# Patient Record
Sex: MALE | Race: WHITE | URBAN - METROPOLITAN AREA
[De-identification: names, ages, dates, MRNs, and addresses within clinical notes are randomized per-mention and may not be internally consistent; named-entity substitution may affect disease eponyms.]

---

## 2017-02-20 ENCOUNTER — EMERGENCY (EMERGENCY)
Facility: HOSPITAL | Age: 32
LOS: 1 days | Discharge: HOME | End: 2017-02-20
Attending: EMERGENCY MEDICINE | Admitting: EMERGENCY MEDICINE
Payer: COMMERCIAL

## 2017-02-20 VITALS
OXYGEN SATURATION: 97 % | DIASTOLIC BLOOD PRESSURE: 74 MMHG | HEART RATE: 72 BPM | TEMPERATURE: 98 F | RESPIRATION RATE: 18 BRPM | SYSTOLIC BLOOD PRESSURE: 126 MMHG

## 2017-02-20 VITALS
WEIGHT: 139.99 LBS | HEART RATE: 70 BPM | OXYGEN SATURATION: 97 % | SYSTOLIC BLOOD PRESSURE: 125 MMHG | RESPIRATION RATE: 18 BRPM | TEMPERATURE: 99 F | DIASTOLIC BLOOD PRESSURE: 72 MMHG

## 2017-02-20 DIAGNOSIS — S02.401A MAXILLARY FRACTURE, UNSPECIFIED SIDE, INITIAL ENCOUNTER FOR CLOSED FRACTURE: ICD-10-CM
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
## 2017-02-20 DIAGNOSIS — S02.2XXA FRACTURE OF NASAL BONES, INITIAL ENCOUNTER FOR CLOSED FRACTURE: ICD-10-CM
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
## 2017-02-20 DIAGNOSIS — Z88.8 ALLERGY STATUS TO OTHER DRUGS, MEDICAMENTS AND BIOLOGICAL SUBSTANCES STATUS: ICD-10-CM
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
## 2017-02-20 DIAGNOSIS — R51 HEADACHE: ICD-10-CM

## 2017-02-20 DIAGNOSIS — R04.0 EPISTAXIS: ICD-10-CM

## 2017-02-20 PROCEDURE — 99284 EMERGENCY DEPT VISIT MOD MDM: CPT | Mod: 25

## 2017-02-20 PROCEDURE — 70486 CT MAXILLOFACIAL W/O DYE: CPT | Mod: 26

## 2017-02-20 PROCEDURE — 99284 EMERGENCY DEPT VISIT MOD MDM: CPT

## 2017-02-20 NOTE — ED PROVIDER NOTE - ENMT, MLM
Airway patent, Nasal mucosa clear. Mouth with normal mucosa. Throat has no vesicles, no oropharyngeal exudates and uvula is midline. mild bruising and abrasion to left lateral nasal bridge. mild swelling. nares patent b/l. no septal hematoma. no active bleeding.

## 2017-02-20 NOTE — ED ADULT TRIAGE NOTE - CHIEF COMPLAINT QUOTE
nosebleed and facial pain after getting hit by a hockey puck last friday.  Denies loc.  Not on thinners.  No  vision loss, neck pain, back pain

## 2017-02-20 NOTE — ED PROVIDER NOTE - ATTENDING CONTRIBUTION TO CARE
Pt is a 31yo M with no PMH who p/w L periorbital pain and epistaxis after being struck with a hockey puck on Friday.  This morning had persistent/recurrent bleeding from nares so came in.  Denies LOC.  No neck pain.  PE - agree with PA exam.  EOM intact and without pain or diplopia.   A/P - Facial trauma - CT facial bones.  Will f/u CT results and treat accordingly.  Epistaxis resolved.

## 2017-02-20 NOTE — ED ADULT NURSE NOTE - OBJECTIVE STATEMENT
Pt received aox3, states he got hit in left eye with hockey puck on Saturday and has soreness since then. Mild bruising noted below left eye. Pt states this morning he had a large nosebleed which stopped spontaneous. Denies pertinent medical hx. Pt states he took advil last on Saturday. Vitals stable. Awaiting provider.

## 2017-02-20 NOTE — ED PROVIDER NOTE - EYES, MLM
Clear bilaterally, pupils equal, round and reactive to light. EOMI intact b/l. mild swelling and tenderness to inferior portion of orbit with mild swelling present.

## 2017-02-20 NOTE — ED PROVIDER NOTE - OBJECTIVE STATEMENT
33 y/o male c/o left orbital pain and epistaxis. pt states struck by hockey puck 2 days ago epistaxis at the time. pt notes mild discomfort to left periorbital region since then and he had episode of epistaxis followed by clot from left nare this am. no ha, visual changes, neck or back pain. no loc. no n/v. no numbness or tingling. no weakness. no further complaints.

## 2017-02-20 NOTE — ED PROVIDER NOTE - MEDICAL DECISION MAKING DETAILS
facial trauma s/p struck by hockey puck 2 days ago. no active bleeding. ct facial bones done and + nasal fx of left maxilla. will given augmentin and f/u with ENT or plastics. facial trauma s/p struck by hockey puck 2 days ago. no active bleeding. ct facial bones done and + nasal fx of left maxilla. will given augmentin and f/u with ENT or plastics. Pt reports Td up to date